# Patient Record
(demographics unavailable — no encounter records)

---

## 2025-06-10 NOTE — ASSESSMENT
[Vaccines Reviewed] : Immunizations reviewed today. Please see immunization details in the vaccine log within the immunization flowsheet.  [FreeTextEntry1] : 1) CPE  - diet / exercise discussed  - labs reviewed  - colonoscopy UTD  - tDAP - utd

## 2025-06-10 NOTE — HISTORY OF PRESENT ILLNESS
[FreeTextEntry1] : CPE   taking meds as advised  allergies have flared recently  he has been getting epidural shots for neck pain

## 2025-06-10 NOTE — PHYSICAL EXAM
[No Acute Distress] : no acute distress [Well Nourished] : well nourished [Well Developed] : well developed [Normal Sclera/Conjunctiva] : normal sclera/conjunctiva [No Edema] : there was no peripheral edema [Normal] : normal gait, coordination grossly intact, no focal deficits and deep tendon reflexes were 2+ and symmetric

## 2025-06-10 NOTE — HEALTH RISK ASSESSMENT
[Good] : ~his/her~  mood as  good [Yes] : Yes [Monthly or less (1 pt)] : Monthly or less (1 point) [Little interest or pleasure doing things] : 1) Little interest or pleasure doing things [Feeling down, depressed, or hopeless] : 2) Feeling down, depressed, or hopeless [0] : 2) Feeling down, depressed, or hopeless: Not at all (0) [PHQ-2 Negative - No further assessment needed] : PHQ-2 Negative - No further assessment needed [Former] : Former [0-4] : 0-4 [> 15 Years] : > 15 Years [Patient reported colonoscopy was normal] : Patient reported colonoscopy was normal [With Significant Other] : lives with significant other [Employed] : employed [] :  [Sexually Active] : sexually active [Feels Safe at Home] : Feels safe at home [Smoke Detector] : smoke detector [Carbon Monoxide Detector] : carbon monoxide detector [Safety elements used in home] : safety elements used in home [de-identified] : weights  [de-identified] : regular  [CVL0Rbptd] : 0 [Change in mental status noted] : No change in mental status noted [Reports changes in hearing] : Reports no changes in hearing [Reports changes in vision] : Reports no changes in vision [Reports changes in dental health] : Reports no changes in dental health [ColonoscopyDate] : 2018

## 2025-07-30 NOTE — PHYSICAL EXAM
[Well Nourished] : well nourished [Well Developed] : well developed [Ill-Appearing] : ill-appearing [Normal Sclera/Conjunctiva] : normal sclera/conjunctiva [Normal Oropharynx] : the oropharynx was normal [No Lymphadenopathy] : no lymphadenopathy [Clear to Auscultation] : lungs were clear to auscultation bilaterally [Regular Rhythm] : with a regular rhythm [Normal S1, S2] : normal S1 and S2 [No Murmur] : no murmur heard [No Edema] : there was no peripheral edema [Soft] : abdomen soft [Non Tender] : non-tender [Normal Bowel Sounds] : normal bowel sounds [Normal Axillary Nodes] : no axillary lymphadenopathy [No CVA Tenderness] : no CVA  tenderness [No Rash] : no rash [Normal Affect] : the affect was normal [Normal Insight/Judgement] : insight and judgment were intact

## 2025-07-30 NOTE — HISTORY OF PRESENT ILLNESS
[FreeTextEntry8] : Pt was away last week on family trip [facility]. Seen today with wife who has no symptoms.  On monday developed diarhea, fever to 104. Body aches, no real headache, no neck pain, nl ROM neck.  Did home covid/flu neg x 2. Taking tyl reduces fever (500 q4). No appetite. Food goes right through him. Keeping up on PO fluids. Nl urination. No rash. No sob, sore throat, congestions. Of note several family members had similar sx immediately before getting together including several small children.  No other localizing symptoms. Pt is fatigued with chills but comfortable with tyl OTC.  Likely viral illness/enteritis.   Will obtain repeat flu and cmp to check lytes.   Advised rest, supportive measures, fu by phone friday. Pt to stay home from work note provided.

## 2025-07-30 NOTE — HISTORY OF PRESENT ILLNESS
Problem: Self Care Deficits Care Plan (Adult) Goal: *Acute Goals and Plan of Care (Insert Text) Description:  
FUNCTIONAL STATUS PRIOR TO ADMISSION: Patient was modified independent using a RW or cane for functional mobility. Has had several falls. HOME SUPPORT: The patient lived alone, states she has caregivers daily that assist with IADLs. Occupational Therapy Goals Initiated 5/6/2021 1. Patient will perform grooming with supervision/set-up in unsupported sit within 7 day(s). 2.  Patient will perform lower body dressing with supervision/set-up within 7 day(s). 3.  Patient will perform toilet transfers with supervision/set-up within 7 day(s). 4.  Patient will perform all aspects of toileting with supervision/set-up within 7 day(s). 5.  Patient will participate in upper extremity therapeutic exercise/activities with supervision/set-up for 5 minutes within 7 day(s). Outcome: Progressing Towards Goal 
OCCUPATIONAL THERAPY RE-EVALUATION Patient: Camacho Carvajal (57 y.o. female) Date: 5/6/2021 Diagnosis: Debility [R53.81] <principal problem not specified> Precautions: Fall Chart, occupational therapy assessment, plan of care, and goals were reviewed. ASSESSMENT Based on the objective data described below, pt is below her mod I baseline due decreased balance, endurance and strength, confusion, R hip pain and impaired functional mobility. Pt received seated in chair, pleasant and agreeable to participate. Using RW she ambulated in room with min A x2, requiring frequent cueing for safe RW management and transfer technique, often demo'd narrow ANIRUDH. Required mod A to stand from lower toilet. She returned to chair and performed grooming ADLs in sitting with setup, too fatigued to complete in standing. Pt is functioning below her baseline at this time, requiring A for ADLs and mobility. Recommend SNF at discharge.  
 
Current Level of Function Impacting Discharge (ADLs): min-mod A x2 [FreeTextEntry8] : Pt was away last week on family trip [facility]. Seen today with wife who has no symptoms.  On monday developed diarhea, fever to 104. Body aches, no real headache, no neck pain, nl ROM neck.  Did home covid/flu neg x 2. Taking tyl reduces fever (500 q4). No appetite. Food goes right through him. Keeping up on PO fluids. Nl urination. No rash. No sob, sore throat, congestions. Of note several family members had similar sx immediately before getting together including several small children.  No other localizing symptoms. Pt is fatigued with chills but comfortable with tyl OTC.  Likely viral illness/enteritis.   Will obtain repeat flu and cmp to check lytes.   Advised rest, supportive measures, fu by phone friday. Pt to stay home from work note provided. tranfers, setup-max A ADLs Other factors to consider for discharge: fall risk, lives alone PLAN : 
Recommendations and Planned Interventions: self care training, functional mobility training, therapeutic exercise, balance training, therapeutic activities, endurance activities, patient education, home safety training, and family training/education Frequency/Duration: Patient will be followed by occupational therapy 5 times a week to address goals. Recommendation for discharge: (in order for the patient to meet his/her long term goals) Therapy up to 5 days/week in SNF setting This discharge recommendation: 
Has been made in collaboration with the attending provider and/or case management Equipment recommendations for successful discharge (if) home: TBD SUBJECTIVE:  
Patient stated I'm ready (to talk back to chair).  OBJECTIVE DATA SUMMARY:  
Hospital course since last seen and reason for reevaluation: no longer on hospice, cleared for therapy Cognitive/Behavioral Status: 
Neurologic State: Alert Orientation Level: Oriented to person;Oriented to place Cognition: Follows commands;Decreased attention/concentration Perception: Appears intact Perseveration: No perseveration noted Safety/Judgement: Fall prevention;Decreased insight into deficits Hearing: Auditory Auditory Impairment: Hard of hearing, bilateral 
 
Vision/Perceptual:   
    
    
Acuity: Within Defined Limits Range of Motion: 
AROM: Generally decreased, functional 
PROM: Within functional limits Strength: 
Strength: Generally decreased, functional 
  
  
 
Coordination: 
Coordination: Generally decreased, functional 
Fine Motor Skills-Upper: Left Intact; Right Intact Gross Motor Skills-Upper: Left Intact; Right Intact Tone & Sensation: 
Tone: Normal 
Sensation: Intact Functional Mobility and Transfers for ADLs: 
Bed Mobility: 
Rolling: (already up on the chair) Transfers: 
Sit to Stand: Minimum assistance;Assist x2 Functional Transfers Toilet Transfer : Moderate assistance; Adaptive equipment; Additional time Bed to Chair: Minimum assistance;Assist x2 Balance: 
Sitting: Intact; High guard Standing: Impaired;Pull to stand; With support Standing - Static: Fair Standing - Dynamic : Fair ADL Assessment: 
Feeding: Minimum assistance Oral Facial Hygiene/Grooming: Minimum assistance Bathing: Moderate assistance Upper Body Dressing: Minimum assistance Lower Body Dressing: Maximum assistance Toileting: Maximum assistance ADL Intervention and task modifications: 
  
 
Grooming Position Performed: Seated in chair Washing Face: Set-up; Supervision Washing Hands: Set-up; Supervision Cognitive Retraining Safety/Judgement: Fall prevention;Decreased insight into deficits Functional Measure: 
Barthel Index: 
 
Bathin Bladder: 10 Bowels: 10 
Groomin Dressin Feeding: 10 Mobility: 0 Stairs: 0 Toilet Use: 5 Transfer (Bed to Chair and Back): 10 Total: 55/100 The Barthel ADL Index: Guidelines 1. The index should be used as a record of what a patient does, not as a record of what a patient could do. 2. The main aim is to establish degree of independence from any help, physical or verbal, however minor and for whatever reason. 3. The need for supervision renders the patient not independent. 4. A patient's performance should be established using the best available evidence. Asking the patient, friends/relatives and nurses are the usual sources, but direct observation and common sense are also important. However direct testing is not needed. 5. Usually the patient's performance over the preceding 24-48 hours is important, but occasionally longer periods will be relevant. 6. Middle categories imply that the patient supplies over 50 per cent of the effort. 7. Use of aids to be independent is allowed. Gael Pina., Barthel, D.W. ([id number]). Functional evaluation: the Barthel Index. [address] [facility] (14)2. BOB Contreras, Maile Santana., Jasmina Zhou., [place], [address] (1999). Measuring the change indisability after inpatient rehabilitation; comparison of the responsiveness of the Barthel Index and Functional [place] Measure. Journal of Neurology, Neurosurgery, and Psychiatry, 66(4), 172-279. SCOTT Cabral, BERNARDINO Real, & Sol Salinas M.A. (2004.) Assessment of post-stroke quality of life in cost-effectiveness studies: The usefulness of the Barthel Index and the EuroQoL-5D. [facility], 13, 270-25 Pain: Pt briefly c/o R hip pain when coming to standing Activity Tolerance:  
Fair After treatment patient left in no apparent distress:  
Sitting in chair, Call bell within reach, and Bed / chair alarm activated COMMUNICATION/COLLABORATION:  
The patients plan of care was discussed with: Physical therapist and Registered nurse. Janeen Duncan OT Time Calculation: 22 mins